# Patient Record
Sex: MALE | Race: WHITE | NOT HISPANIC OR LATINO | Employment: FULL TIME
[De-identification: names, ages, dates, MRNs, and addresses within clinical notes are randomized per-mention and may not be internally consistent; named-entity substitution may affect disease eponyms.]

---
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
## 2021-06-07 ENCOUNTER — OFFICE VISIT (OUTPATIENT)
Dept: FAMILY MEDICINE | Facility: CLINIC | Age: 21
End: 2021-06-07
Payer: COMMERCIAL

## 2021-06-07 VITALS
BODY MASS INDEX: 30.52 KG/M2 | TEMPERATURE: 97.4 F | HEIGHT: 70 IN | WEIGHT: 213.2 LBS | SYSTOLIC BLOOD PRESSURE: 122 MMHG | RESPIRATION RATE: 16 BRPM | DIASTOLIC BLOOD PRESSURE: 68 MMHG | HEART RATE: 68 BPM

## 2021-06-07 DIAGNOSIS — Z23 NEED FOR VACCINATION: ICD-10-CM

## 2021-06-07 DIAGNOSIS — R10.33 PERIUMBILICAL ABDOMINAL PAIN: Primary | ICD-10-CM

## 2021-06-07 PROCEDURE — 90471 IMMUNIZATION ADMIN: CPT | Performed by: NURSE PRACTITIONER

## 2021-06-07 PROCEDURE — 90715 TDAP VACCINE 7 YRS/> IM: CPT | Performed by: NURSE PRACTITIONER

## 2021-06-07 PROCEDURE — 99204 OFFICE O/P NEW MOD 45 MIN: CPT | Mod: 25 | Performed by: NURSE PRACTITIONER

## 2021-06-07 ASSESSMENT — ENCOUNTER SYMPTOMS
RHINORRHEA: 0
EYE DISCHARGE: 0
HEADACHES: 0
ABDOMINAL PAIN: 1
FATIGUE: 0
SINUS PRESSURE: 0
SORE THROAT: 0
SHORTNESS OF BREATH: 0
WHEEZING: 0
VOMITING: 0
FEVER: 0
COUGH: 0
NAUSEA: 0
DIAPHORESIS: 0
DIARRHEA: 0

## 2021-06-07 ASSESSMENT — PAIN SCALES - GENERAL: PAINLEVEL: NO PAIN (0)

## 2021-06-07 ASSESSMENT — MIFFLIN-ST. JEOR: SCORE: 1969.35

## 2021-06-07 NOTE — PATIENT INSTRUCTIONS
You can call imaging scheduling to set up appointment date, time, and location that works best for you to have the CT scan of your abdomen. [phone]     No heavy lifting.    Avoid core work.    Please make an appointment for a physical.

## 2021-06-07 NOTE — PROGRESS NOTES
"    Assessment & Plan     Periumbilical abdominal pain  Concern for umbilical hernia.  We will set up for CT of abdomen.  Encouraged to avoid core exercises and heavy lifting.  Educated regarding warning signs to watch for and when would need to seek immediate medical attention  - CT Abdomen w/o Contrast; Future    Need for vaccination  Encouraged to set up appointment for physical.  We will administer Tdap vaccine today in clinic.  - TDAP VACCINE (Adacel, Boostrix)  [[id number]]    Declines COVID-19 vaccine.    Ordering of each unique test  22 minutes spent on the date of the encounter doing chart review, history and exam, documentation and further activities per the note     BMI:   Estimated body mass index is 30.81 kg/m  as calculated from the following:    Height as of this encounter: 1.772 m (5' 9.75\").    Weight as of this encounter: 96.7 kg (213 lb 3.2 oz).       Return in about 1 month (around 7/7/2021) for A Physical Exam.    RODOLFO Leija CNP  [facility] AWILDA Calzada is a 21 year old who presents for the following health issues     HPI     Chief Complaint   Patient presents with     Hernia     Sharp pain to right of navel with exertion. First started after an intense aerobic workout 2 weeks ago. Concerned he has a hernia.      Pain to right side of abdomen, near her bellybutton for the last 2 weeks. Will have sharp pain at times when is active and working out. Has not been having as much pain now that is not as active.  Recently started to workout again.  No bulge noted to this area.  No fevers or chills.    Review of Systems   Constitutional: Negative for diaphoresis, fatigue and fever.   HENT: Negative for congestion, ear pain, rhinorrhea, sinus pressure and sore throat.    Eyes: Negative for discharge.   Respiratory: Negative for cough, shortness of breath and wheezing.    Cardiovascular: Negative for chest pain.   Gastrointestinal: Positive for abdominal pain (Right " "side of [facility]). Negative for diarrhea, nausea and vomiting.   Neurological: Negative for headaches.          Objective    /68 (BP Location: Right arm, Patient Position: Sitting, Cuff Size: Adult Large)   Pulse 68   Temp 97.4  F (36.3  C) (Tympanic)   Resp 16   Ht 1.772 m (5' 9.75\")   Wt 96.7 kg (213 lb 3.2 oz)   BMI 30.81 kg/m    Body mass index is 30.81 kg/m .  Physical Exam  Constitutional:       Appearance: He is well-developed.   Cardiovascular:      Rate and Rhythm: Normal rate and regular rhythm.      Heart sounds: Normal heart sounds.   Pulmonary:      Effort: Pulmonary effort is normal.      Breath sounds: Normal breath sounds.   Abdominal:       Skin:     General: Skin is warm and dry.   Neurological:      Mental Status: He is alert.              "

## 2021-07-25 ENCOUNTER — HEALTH MAINTENANCE LETTER (OUTPATIENT)
Age: 21
End: 2021-07-25

## 2021-09-19 ENCOUNTER — HEALTH MAINTENANCE LETTER (OUTPATIENT)
Age: 21
End: 2021-09-19

## 2022-08-20 ENCOUNTER — HEALTH MAINTENANCE LETTER (OUTPATIENT)
Age: 22
End: 2022-08-20

## 2022-11-20 ENCOUNTER — HEALTH MAINTENANCE LETTER (OUTPATIENT)
Age: 22
End: 2022-11-20

## 2023-09-16 ENCOUNTER — HEALTH MAINTENANCE LETTER (OUTPATIENT)
Age: 23
End: 2023-09-16